# Patient Record
Sex: FEMALE | Race: BLACK OR AFRICAN AMERICAN | NOT HISPANIC OR LATINO | Employment: OTHER | URBAN - METROPOLITAN AREA
[De-identification: names, ages, dates, MRNs, and addresses within clinical notes are randomized per-mention and may not be internally consistent; named-entity substitution may affect disease eponyms.]

---

## 2024-12-14 ENCOUNTER — HOSPITAL ENCOUNTER (EMERGENCY)
Facility: HOSPITAL | Age: 69
Discharge: HOME | End: 2024-12-14
Payer: COMMERCIAL

## 2024-12-14 VITALS
SYSTOLIC BLOOD PRESSURE: 120 MMHG | HEIGHT: 63 IN | HEART RATE: 68 BPM | TEMPERATURE: 97.7 F | WEIGHT: 162 LBS | BODY MASS INDEX: 28.7 KG/M2 | OXYGEN SATURATION: 99 % | DIASTOLIC BLOOD PRESSURE: 77 MMHG | RESPIRATION RATE: 16 BRPM

## 2024-12-14 DIAGNOSIS — R30.0 DYSURIA: Primary | ICD-10-CM

## 2024-12-14 LAB
APPEARANCE UR: CLEAR
BILIRUB UR STRIP.AUTO-MCNC: NEGATIVE MG/DL
COLOR UR: COLORLESS
GLUCOSE UR STRIP.AUTO-MCNC: NORMAL MG/DL
KETONES UR STRIP.AUTO-MCNC: NEGATIVE MG/DL
LEUKOCYTE ESTERASE UR QL STRIP.AUTO: NEGATIVE
NITRITE UR QL STRIP.AUTO: NEGATIVE
PH UR STRIP.AUTO: 6.5 [PH]
PROT UR STRIP.AUTO-MCNC: NEGATIVE MG/DL
RBC # UR STRIP.AUTO: NEGATIVE /UL
SP GR UR STRIP.AUTO: 1
UROBILINOGEN UR STRIP.AUTO-MCNC: NORMAL MG/DL

## 2024-12-14 PROCEDURE — 99283 EMERGENCY DEPT VISIT LOW MDM: CPT

## 2024-12-14 PROCEDURE — 2500000001 HC RX 250 WO HCPCS SELF ADMINISTERED DRUGS (ALT 637 FOR MEDICARE OP)

## 2024-12-14 PROCEDURE — 81003 URINALYSIS AUTO W/O SCOPE: CPT

## 2024-12-14 RX ORDER — FLUCONAZOLE 150 MG/1
150 TABLET ORAL ONCE
Qty: 1 TABLET | Refills: 0 | Status: SHIPPED | OUTPATIENT
Start: 2024-12-14 | End: 2024-12-14

## 2024-12-14 RX ORDER — FLUCONAZOLE 150 MG/1
150 TABLET ORAL ONCE
Status: COMPLETED | OUTPATIENT
Start: 2024-12-14 | End: 2024-12-14

## 2024-12-14 RX ADMIN — FLUCONAZOLE 150 MG: 150 TABLET ORAL at 17:05

## 2024-12-14 ASSESSMENT — PAIN DESCRIPTION - PAIN TYPE: TYPE: ACUTE PAIN

## 2024-12-14 ASSESSMENT — PAIN SCALES - GENERAL: PAINLEVEL_OUTOF10: 5 - MODERATE PAIN

## 2024-12-14 ASSESSMENT — COLUMBIA-SUICIDE SEVERITY RATING SCALE - C-SSRS
6. HAVE YOU EVER DONE ANYTHING, STARTED TO DO ANYTHING, OR PREPARED TO DO ANYTHING TO END YOUR LIFE?: NO
2. HAVE YOU ACTUALLY HAD ANY THOUGHTS OF KILLING YOURSELF?: NO
1. IN THE PAST MONTH, HAVE YOU WISHED YOU WERE DEAD OR WISHED YOU COULD GO TO SLEEP AND NOT WAKE UP?: NO

## 2024-12-14 ASSESSMENT — PAIN - FUNCTIONAL ASSESSMENT: PAIN_FUNCTIONAL_ASSESSMENT: 0-10

## 2024-12-14 ASSESSMENT — PAIN DESCRIPTION - FREQUENCY: FREQUENCY: CONSTANT/CONTINUOUS

## 2024-12-14 ASSESSMENT — PAIN DESCRIPTION - ORIENTATION: ORIENTATION: LOWER

## 2024-12-14 ASSESSMENT — PAIN DESCRIPTION - LOCATION: LOCATION: PELVIS

## 2024-12-14 NOTE — DISCHARGE INSTRUCTIONS
Please take Diflucan in 72 hours to treat a possible yeast infection and follow-up with your primary care provider in 2 to 3 days for further evaluation of your symptoms.  Present back to ER for any worsening of symptoms such as fevers, chills, nausea, vomiting.  It is important to remember that your care does not end here and you must continue to monitor your condition closely. Please return to the emergency department for any worsening or concerning signs or symptoms as directed by our conversations and the discharge instructions. If you do not have a doctor please contact the referral number on your discharge instructions. Please contact any physician specialists provided in your discharge notes as it is very important to follow up with them regarding your condition. If you are unable to reach the physicians provided, please come back to the Emergency Department at any time.

## 2024-12-14 NOTE — ED PROVIDER NOTES
HPI   Chief Complaint   Patient presents with    Urinary Frequency     Pt has c/o dysuria x 1 week. Pt denies any fever or chills.        HPI  Patient is a 69-year-old female with history of diabetes presenting for evaluation of dysuria for 1 week.  Patient states that it is burning when she uses the bathroom and also intermittently is irritated even when she is not using the bathroom in her vaginal area.  States she has been drinking cranberry juice which normally rinse her of her symptoms but it has not this time.  She denies fevers or chills.  Denies flank pain, abdominal pain, nausea or vomiting.  She states she is not sexually active and denies any new partners, vaginal bleeding or discharge.      Patient History   Past Medical History:   Diagnosis Date    Other conditions influencing health status 07/16/2013    Diabetic Autonomic Neuropathy Type 2    Pure hypercholesterolemia, unspecified 06/17/2013    Low-density-lipoid-type (LDL) hyperlipoproteinemia     Past Surgical History:   Procedure Laterality Date    OTHER SURGICAL HISTORY  02/21/2017    History Of Prior Surgery     No family history on file.  Social History     Tobacco Use    Smoking status: Not on file    Smokeless tobacco: Not on file   Substance Use Topics    Alcohol use: Not on file    Drug use: Not on file       Physical Exam   ED Triage Vitals [12/14/24 1559]   Temperature Heart Rate Respirations BP   36.5 °C (97.7 °F) 68 16 120/77      Pulse Ox Temp src Heart Rate Source Patient Position   99 % -- -- Sitting      BP Location FiO2 (%)     Left arm --       Physical Exam  Vitals and nursing note reviewed.   Constitutional:       General: She is not in acute distress.     Appearance: She is not toxic-appearing.   HENT:      Head: Normocephalic.   Abdominal:      General: Abdomen is flat. There is no distension.      Palpations: Abdomen is soft.      Tenderness: There is no abdominal tenderness. There is no right CVA tenderness or left CVA  tenderness.   Skin:     General: Skin is warm and dry.   Neurological:      Mental Status: She is alert.           ED Course & MDM   Diagnoses as of 12/19/24 0033   Dysuria                 No data recorded     [place] Coma Scale Score: 15 (12/14/24 1558 : Kari Rogers, ALTAF)                           Medical Decision Making  Parts of this chart have been completed using voice recognition software. Please excuse any errors of transcription.  My thought process and reason for plan has been formulated from the time that I saw the patient until the time of disposition and is not specific to one specific moment during their visit and furthermore my MDM encompasses this entire chart and not only this text box.      HPI: Detailed above.    Exam: A medically appropriate exam performed, outlined above, given the known history and presentation.    History obtained from: Patient    Social Determinants of Health considered during this visit: Lives independently    Medications given during visit:  Medications   fluconazole (Diflucan) tablet 150 mg (150 mg oral Given 12/14/24 1705)        Diagnostic/tests  Labs Reviewed   URINALYSIS WITH REFLEX CULTURE AND MICROSCOPIC - Abnormal       Result Value    Color, Urine Colorless (*)     Appearance, Urine Clear      Specific Gravity, Urine 1.004 (*)     pH, Urine 6.5      Protein, Urine NEGATIVE      Glucose, Urine Normal      Blood, Urine NEGATIVE      Ketones, Urine NEGATIVE      Bilirubin, Urine NEGATIVE      Urobilinogen, Urine Normal      Nitrite, Urine NEGATIVE      Leukocyte Esterase, Urine NEGATIVE     URINALYSIS WITH REFLEX CULTURE AND MICROSCOPIC    Narrative:     The following orders were created for panel order Urinalysis with Reflex Culture and Microscopic.  Procedure                               Abnormality         Status                     ---------                               -----------         ------                     Urinalysis with Reflex C...[[id number]]   Abnormal            Final result               Extra Urine Gray Tube[[id number]]                            Final result                 Please view results for these tests on the individual orders.   EXTRA URINE GRAY TUBE    Extra Tube Hold for add-ons.        No orders to display        Considerations/further MDM:  Patient is a 69-year-old female presenting for evaluation of urinary frequency    Patient is well-appearing in no apparent distress with stable vital signs.  Nontoxic and afebrile.  Abdomen is soft, nontender and the patient has no significant CVA tenderness to palpation.  Urinalysis is unremarkable.  History and clinical presentation most suggestive of yeast infection.  Patient provided first dose of Diflucan during the ER visit.  My suspicion for pyelonephritis is low.  Stable for discharge.  Instructed to follow-up with her primary care provider for results of her urine culture within 48 to 72 hours.  Additional dose of Diflucan provided to be taken in 3 days.  Return precautions are discussed with the patient.    Procedure  Procedures     Moira Estrella PA-C  12/19/24 0033

## 2024-12-15 LAB — HOLD SPECIMEN: NORMAL
